# Patient Record
Sex: FEMALE | Race: AMERICAN INDIAN OR ALASKA NATIVE
[De-identification: names, ages, dates, MRNs, and addresses within clinical notes are randomized per-mention and may not be internally consistent; named-entity substitution may affect disease eponyms.]

---

## 2022-06-23 NOTE — MAMMOGRAPHY REPORT
DIGITAL SCREENING MAMMOGRAM WITH CAD, 6/20/2022



CLINICAL INFORMATION / INDICATION: Routine screening mammography.



TECHNIQUE:  Digital bilateral 2D mammography was obtained in the craniocaudal and mediolateral obliqu
e projections. This examination was interpreted with the benefit of Computer-Aided Detection analysis
.



COMPARISON: 5/17/2021, 10/25/2019



FINDINGS: 



Breast Density: The breasts are heterogeneously dense, which may obscure small masses.



No dominant mass, suspicious calcifications, or architectural distortion in the right breast. There i
s new asymmetry noted in the superior left breast, seen only on MLO view. Spot compression views are 
recommended for further evaluation. Left breast biopsy clip is again noted.

 

IMPRESSION: New asymmetry noted in the superior left breast. Recommend spot compression imaging with 
possible left breast ultrasound.



Follow up recommendation: Special View: Spot Compression



BI-RADS Category 0: INCOMPLETE. Needs additional imaging evaluation and/or prior mammograms for bennett dixon.





-------------------------------------------------------------------------------------------

A "normal" or negative report should not discourage follow up or biopsy of a clinically significant f
inding.



A written summary of these findings will be mailed to the patient. The patient will be entered into a
 mammography reporting system which will generate a reminder letter for the patient's next appointmen
t at the appropriate interval.



The [employer] recommends yearly mammograms starting at age 40 and continuing as l
madie as a woman is in good health.  Breast MRI is recommended for women with an approximate 20-25% or 
greater lifetime risk of breast cancer, including women with a strong family history of breast or ova
juan carlos cancer or who have been treated for Hodgkin's disease.



Signer Name: Sharri Andrade MD 

Signed: 6/23/2022 12:35 PM

Workstation Name: [email or web address]

## 2022-07-25 ENCOUNTER — HOSPITAL ENCOUNTER (OUTPATIENT)
Dept: HOSPITAL 5 - MAMMO | Age: 82
Discharge: HOME | End: 2022-07-25
Payer: MEDICARE

## 2022-07-25 DIAGNOSIS — R92.8: Primary | ICD-10-CM

## 2022-07-25 NOTE — MAMMOGRAPHY REPORT
DIGITAL DIAGNOSTIC MAMMOGRAM WITH CAD CONVENTIONAL, 7/25/2022



CLINICAL INFORMATION / INDICATION: Abnormal screening mammogram. Screening recall of the left breast 
for asymmetry.



TECHNIQUE:  Digital left mammographic imaging was performed. Spot compression views were obtained.

This examination was interpreted with the benefit of Computer-aided Detection analysis. 



COMPARISON: Bilateral mammogram, 6/20/2022, 5/17/2021, 10/25/2018 and 7/31/2018



FINDINGS: 



Breast Density: The breasts are heterogeneously dense, which may obscure small masses.



Spot compression views demonstrate complete resolution of the previously noted asymmetry in the super
ior aspect of the left breast. No suspicious mammographic finding is identified. The appearance of th
e breast parenchyma is unchanged when compared to multiple prior studies.



IMPRESSION: No mammographic evidence of malignancy.



Follow up recommendation: Routine yearly screening mammogram.



BI-RADS Category 1:  NEGATIVE.



-------------------------------------------------------------------------------------------

A "normal" or negative report should not discourage follow up or biopsy of a clinically significant f
inding.



A written summary of these findings will be mailed to the patient. The patient will be entered into a
 mammography reporting system which will generate a reminder letter for the patient's next appointmen
t at the appropriate interval.



According to the [employer], yearly mammograms are recommended starting at age 40 
and continuing as long as a woman is in good health.  Breast MRI is recommended for women with an marquise
roximately 20-25% or greater lifetime risk of breast cancer, including women with a strong family his
tory of breast or ovarian cancer and women who have been treated for Hodgkin's disease.



Signer Name: Laxmi Purcell MD 

Signed: 7/25/2022 11:12 AM

Workstation Name: [id number]